# Patient Record
Sex: FEMALE | Race: OTHER
[De-identification: names, ages, dates, MRNs, and addresses within clinical notes are randomized per-mention and may not be internally consistent; named-entity substitution may affect disease eponyms.]

---

## 2019-10-13 ENCOUNTER — HOSPITAL ENCOUNTER (EMERGENCY)
Dept: HOSPITAL 72 - EMR | Age: 84
Discharge: ANOTHER HOSPITAL | End: 2019-10-13
Payer: MEDICARE

## 2019-10-13 VITALS — DIASTOLIC BLOOD PRESSURE: 56 MMHG | SYSTOLIC BLOOD PRESSURE: 166 MMHG

## 2019-10-13 VITALS — SYSTOLIC BLOOD PRESSURE: 152 MMHG | DIASTOLIC BLOOD PRESSURE: 74 MMHG

## 2019-10-13 VITALS — DIASTOLIC BLOOD PRESSURE: 74 MMHG | SYSTOLIC BLOOD PRESSURE: 152 MMHG

## 2019-10-13 VITALS — SYSTOLIC BLOOD PRESSURE: 145 MMHG | DIASTOLIC BLOOD PRESSURE: 83 MMHG

## 2019-10-13 VITALS — BODY MASS INDEX: 25.76 KG/M2 | WEIGHT: 140 LBS | HEIGHT: 62 IN

## 2019-10-13 DIAGNOSIS — I51.7: ICD-10-CM

## 2019-10-13 DIAGNOSIS — I73.9: ICD-10-CM

## 2019-10-13 DIAGNOSIS — F03.90: ICD-10-CM

## 2019-10-13 DIAGNOSIS — N18.9: ICD-10-CM

## 2019-10-13 DIAGNOSIS — I12.9: ICD-10-CM

## 2019-10-13 DIAGNOSIS — I63.81: ICD-10-CM

## 2019-10-13 DIAGNOSIS — I10: ICD-10-CM

## 2019-10-13 DIAGNOSIS — H31.301: Primary | ICD-10-CM

## 2019-10-13 DIAGNOSIS — E78.5: ICD-10-CM

## 2019-10-13 DIAGNOSIS — F17.200: ICD-10-CM

## 2019-10-13 LAB
ADD MANUAL DIFF: NO
ALBUMIN SERPL-MCNC: 3 G/DL (ref 3.4–5)
ALBUMIN/GLOB SERPL: 0.8 {RATIO} (ref 1–2.7)
ALP SERPL-CCNC: 106 U/L (ref 46–116)
ALT SERPL-CCNC: 15 U/L (ref 12–78)
ANION GAP SERPL CALC-SCNC: 7 MMOL/L (ref 5–15)
AST SERPL-CCNC: 16 U/L (ref 15–37)
BASOPHILS NFR BLD AUTO: 0.6 % (ref 0–2)
BILIRUB SERPL-MCNC: 0.3 MG/DL (ref 0.2–1)
BUN SERPL-MCNC: 30 MG/DL (ref 7–18)
CALCIUM SERPL-MCNC: 9.7 MG/DL (ref 8.5–10.1)
CHLORIDE SERPL-SCNC: 106 MMOL/L (ref 98–107)
CK SERPL-CCNC: 32 U/L (ref 26–308)
CO2 SERPL-SCNC: 30 MMOL/L (ref 21–32)
CREAT SERPL-MCNC: 1.8 MG/DL (ref 0.55–1.3)
EOSINOPHIL NFR BLD AUTO: 2.5 % (ref 0–3)
ERYTHROCYTE [DISTWIDTH] IN BLOOD BY AUTOMATED COUNT: 12.1 % (ref 11.6–14.8)
GLOBULIN SER-MCNC: 3.8 G/DL
HCT VFR BLD CALC: 37.4 % (ref 37–47)
HGB BLD-MCNC: 12.2 G/DL (ref 12–16)
LYMPHOCYTES NFR BLD AUTO: 24.2 % (ref 20–45)
MCV RBC AUTO: 91 FL (ref 80–99)
MONOCYTES NFR BLD AUTO: 7.7 % (ref 1–10)
NEUTROPHILS NFR BLD AUTO: 65 % (ref 45–75)
PLATELET # BLD: 168 K/UL (ref 150–450)
POTASSIUM SERPL-SCNC: 4.5 MMOL/L (ref 3.5–5.1)
RBC # BLD AUTO: 4.09 M/UL (ref 4.2–5.4)
SODIUM SERPL-SCNC: 143 MMOL/L (ref 136–145)
WBC # BLD AUTO: 8.1 K/UL (ref 4.8–10.8)

## 2019-10-13 PROCEDURE — 96375 TX/PRO/DX INJ NEW DRUG ADDON: CPT

## 2019-10-13 PROCEDURE — 85025 COMPLETE CBC W/AUTO DIFF WBC: CPT

## 2019-10-13 PROCEDURE — 70450 CT HEAD/BRAIN W/O DYE: CPT

## 2019-10-13 PROCEDURE — 96374 THER/PROPH/DIAG INJ IV PUSH: CPT

## 2019-10-13 PROCEDURE — 82550 ASSAY OF CK (CPK): CPT

## 2019-10-13 PROCEDURE — 36415 COLL VENOUS BLD VENIPUNCTURE: CPT

## 2019-10-13 PROCEDURE — 70551 MRI BRAIN STEM W/O DYE: CPT

## 2019-10-13 PROCEDURE — 93005 ELECTROCARDIOGRAM TRACING: CPT

## 2019-10-13 PROCEDURE — 99284 EMERGENCY DEPT VISIT MOD MDM: CPT

## 2019-10-13 PROCEDURE — 96361 HYDRATE IV INFUSION ADD-ON: CPT

## 2019-10-13 PROCEDURE — 80053 COMPREHEN METABOLIC PANEL: CPT

## 2019-10-13 NOTE — NUR
ED Nurse Note:



[facility] ambulance has arrived for transport, rig#[id number], verbal report and all 
transfer info given to LEONARDO Mares, pt being transported via gurney on critical 
care transport, report called to [facility] at [phone], report 
given to LEONARDO Silverio, nad noted during pt transpsort.

## 2019-10-13 NOTE — DIAGNOSTIC IMAGING REPORT
EXAM:

  CT Head Without Intravenous Contrast

 

CLINICAL HISTORY:

  TRAUMA

 

TECHNIQUE:

  Axial computed tomography images of the head brain without intravenous 

contrast.  CTDI is 62.7 mGy and DLP is 1394.9 mGy-cm.  One or more of the 

following dose reduction techniques were used: automated exposure control,

 adjustment of the mA and or kV according to patient size, use of 

iterative reconstruction technique.

 

COMPARISON:

  None

 

FINDINGS:

  Brain: No acute infarct or hemorrhage identified. No extra-axial fluid 

collection. No mass effect or midline shift. Scattered areas of 

hypoattenuation in the supratentorial white matter likely represent 

chronic small vessel ischemic changes.  Remote lacunar infarcts in the 

basal ganglia.

 

  Ventricles and sulci: Prominence of the ventricles and sulci is likely 

secondary to cerebral volume loss.  Question small amount of blood along 

the choroid plexus in the right lateral ventricle.

 

  Skull: Hyperostosis frontalis interna. No bony lesion or fracture.

 

  Subcutaneous tissues: Normal.

 

  Sinuses: Mild mucosal thickening in the right sphenoid sinus with 

hyperostosis of the sinus walls, suggesting chronic sinusitis.  Mild 

mucosal thickening in the ethmoid air cells.

 

  Orbits: Bilateral lens implants.

 

  Other: Atherosclerotic calcifications in the intracranial vasculature.

 

IMPRESSION:   

1.  Probable small amount of blood along the choroid plexus of the right 

lateral ventricle.  No other acute intracranial abnormality identified.

2.  Chronic small vessel ischemic changes and cerebral volume loss.  

Remote lacunar infarcts in the basal ganglia.

## 2019-10-13 NOTE — NUR
ED Nurse Note:

PT AAOX1, VSS, NO ACUTE DISTRESS. Brought in by ambulance from [facility]; patient was found on the floor ~ 0250  today; possible fall injury; 
patient is on ASA and Heparin. Patient c/o pain over the righ head. ECCYMOSIS 
ON THE LEFT KNEE. NO OTHER SKIN ISSUES.

## 2019-10-13 NOTE — EMERGENCY ROOM REPORT
History of Present Illness


General


Chief Complaint:  Multiple Trauma/Fall


Source:  Medical Record, EMS


 (Jin Ramey MD)





Present Illness


HPI


Disclaimer: Please note that this report is being documented using [employer]AGON 

technology. This can lead to erroneous entry secondary to incorrect 

interpretation by the dictating instrument.





HPI: Patient is an 88-year-old female, Farsi speaking, with a history of AAA 

nonruptured, hypertension, hyperlipidemia, CKD, dementia, PVD, renal artery 

stenosis status post stent and a chronic smoker presenting for evaluation after 

she was found on the floor.  According to EMS, the patient was found by nursing 

staff next to her bed approximately 2:50 AM.  Unknown when the patient was last 

seen.  Unknown how long she was down on the ground.


 


PMH: Hypertension, hyperlipidemia, AAA, renal artery stenosis, PVD, CKD,


 


PSH: Renal artery stent


 


Allergies: Denies


 


Social Hx: Chronic smoker


 (Jin Ramey MD)


Allergies:  


Coded Allergies:  


     No Known Allergies (Unverified , 10/13/19)





Nursing Documentation-PMH


Past Medical History:  No History, Except For


Hx Cardiac Problems:  Yes - PVD, hyperlipidemia


Hx Hypertension:  Yes


Hx Gastrointestinal Problems:  Yes - dysphagia


Hx Neurological Problems:  Yes - Metabolic encephalopathy, muscle weakness, 

dementia


 (Jin Ramey MD)





Review of Systems


All Other Systems:  negative except mentioned in HPI


 (Jin Ramey MD)





Physical Exam





Vital Signs








  Date Time  Temp Pulse Resp B/P (MAP) Pulse Ox O2 Delivery O2 Flow Rate FiO2


 


10/13/19 09:51 97.3 74 18 155/57 (89) 96 Room Air  





 


General: Awake and alert, no acute distress


HEENT: Normocephalic, atraumatic.  There are no scalp or face hematomas, 

lacerations or abrasions.  No tenderness or soft tissue swelling over the 

facial bones.  EOMI. PERRLA.  No septal hematoma.  No oral lacerations.  

Dentition is intact.  No malocclusion


Neck: Supple, trachea midline.  Arrives without cervical collar


Chest Wall: No tenderness, no deformity, no crepitus


CV: RRR.  S1 and S2 normal.  No murmur appreciated


Resp: Normal work of breathing. No cough, wheezing or crackles appreciated


Abd: Soft, nontender, nondistended


Skin: Intact.  No abrasions, laceration or rash over the exposed skin


MSK: Normal tone and bulk.  No obvious deformity. Moving all extremities.  

Ambulating without difficulty. 


Neuro: Awake and alert.  Mentating appropriately.  Sensation is intact to light 

touch over the dermatomes of the upper and lower extremities


Spine: There is no tenderness, step-off or deformity in the cervical, thoracic 

or lumbosacral spine.


 (Jin Ramey MD)





Procedures


Critical Care Time


Critical Care Time


Total Critical Care Time: 30 min bedside evaluation and treatment excludes 

procedures (EKG).


Reason for critical care: Intracerebral bleed


Possible complications: hypotension, hypertension, MI, shock, arrhythmias, 

metabolic acidosis, end organ damage, respiratory failure.


Interventions: Discussion with [facility] regarding higher level of care.  Repeat 

neurologic exams.


Course: Patient presented after unwitnessed fall.  Initial CT showed question 

of choroid plexus bleed on the right.  MRI was performed.  This confirmed 

possible choroid plexus bleed.  Reassessment neurologic exam unchanged.  

Contact [facility] with multiple discussions.  Attempt to contact family 

unsuccessful.  Accepted by [facility] neurosurgeon.  EKG reviewed.  Transferred by 

ALS.


Consultations: nursing staff, EMS, attempt to contact family, discussions with 

[facility] transfer and neurosurgeon


Performed by: Dr. Rubio


Tolerated well condition = critical but stable for transfer


 (Bry Rubio MD)





Medical Decision Making


Diagnostic Impression:  


 Primary Impression:  


 Fall


 Qualified Codes:  W19.XXXA - Unspecified fall, initial encounter


 Additional Impression:  


 Choroid plexus hemorrhage


ER Course


88-year-old female presents for evaluation after a head injury.  Will obtain CT 

scan of the head and broad metabolic and infectious labs to determine the cause 

of her fall.  Will await family members to provide additional history.  She has 

stable vital signs and is overall well-appearing.





Laboratory Tests








Test


  10/13/19


10:00


 


White Blood Count


  8.1 K/UL


(4.8-10.8)


 


Red Blood Count


  4.09 M/UL


(4.20-5.40)  L


 


Hemoglobin


  12.2 G/DL


(12.0-16.0)


 


Hematocrit


  37.4 %


(37.0-47.0)


 


Mean Corpuscular Volume 91 FL (80-99)  


 


Mean Corpuscular Hemoglobin


  29.8 PG


(27.0-31.0)


 


Mean Corpuscular Hemoglobin


Concent 32.6 G/DL


(32.0-36.0)


 


Red Cell Distribution Width


  12.1 %


(11.6-14.8)


 


Platelet Count


  168 K/UL


(150-450)


 


Mean Platelet Volume


  6.4 FL


(6.5-10.1)  L


 


Neutrophils (%) (Auto)


  65.0 %


(45.0-75.0)


 


Lymphocytes (%) (Auto)


  24.2 %


(20.0-45.0)


 


Monocytes (%) (Auto)


  7.7 %


(1.0-10.0)


 


Eosinophils (%) (Auto)


  2.5 %


(0.0-3.0)


 


Basophils (%) (Auto)


  0.6 %


(0.0-2.0)


 


Sodium Level


  143 MMOL/L


(136-145)


 


Potassium Level


  4.5 MMOL/L


(3.5-5.1)


 


Chloride Level


  106 MMOL/L


()


 


Carbon Dioxide Level


  30 MMOL/L


(21-32)


 


Anion Gap


  7 mmol/L


(5-15)


 


Blood Urea Nitrogen


  30 mg/dL


(7-18)  H


 


Creatinine


  1.8 MG/DL


(0.55-1.30)  H


 


Estimate Glomerular


Filtration Rate  mL/min (>60)  


 


 


Glucose Level


  111 MG/DL


()  H


 


Calcium Level


  9.7 MG/DL


(8.5-10.1)


 


Total Bilirubin


  0.3 MG/DL


(0.2-1.0)


 


Aspartate Amino Transferase


(AST) 16 U/L (15-37)


 


 


Alanine Aminotransferase (ALT)


  15 U/L (12-78)


 


 


Alkaline Phosphatase


  106 U/L


()


 


Total Creatine Kinase


  32 U/L


()


 


Total Protein


  6.8 G/DL


(6.4-8.2)


 


Albumin


  3.0 G/DL


(3.4-5.0)  L


 


Globulin 3.8 g/dL  


 


Albumin/Globulin Ratio


  0.8 (1.0-2.7)


L








 (Jin Ramey MD)


ER Course


Patient signed out to me by Dr. Ramey.


Patient ambulates with assistance.  She has underlying dementia.


There is some question on CT scan about a possible choroid plexus bleed.  An 

MRI was performed.


Patient was pretreated with Ativan successfully.


MRI suggests also the choroid plexus bleed.  [facility] is being contacted.





Call [facility]. They are presenting.


17:45 discussed with and presented to Dr. Vigil.  Multiple questions 

answered for him.  He requests that the CT and MRI be messenger to [facility].  He 

also requested that we attempt to contact family about CODE STATUS and how 

aggressive they want us to pursue this finding.





Accepted by Dr. Aquino at [facility] to ICU.  Transfer ALS.  Will obtain EKG.





EKG normal sinus rhythm LVH nonspecific ST T wave changes.





Neurologic exam unchanged but sedated.  Stable for transfer ALS.


 (Bry Rubio MD)





EKG Diagnostic Results


Rate:  normal


Rhythm:  NSR


ST Segments:  no acute changes - Left ventricular hypertrophy


 (Bry Rubio MD)





Rhythm Strip Diag. Results


EP Interpretation:  yes


Rhythm:  NSR, no PVC's, no ectopy, other


 (Bry Rubio MD)





CT/MRI/US Diagnostic Results


CT/MRI/US Diagnostic Results :  


   Impression


Final Report


EXAM:


CT Head Without Intravenous Contrast





CLINICAL HISTORY:


TRAUMA





TECHNIQUE:


Axial computed tomography images of the head/brain without intravenous 

contrast. CTDI is 62.7 mGy and DLP is 1394.9 mGy-cm. One or more of the 

following dose reduction techniques were used: automated exposure control, 

adjustment of the mA and/or kV according to patient size, use of iterative 

reconstruction technique.





COMPARISON:


None





FINDINGS:


Brain: No acute infarct or hemorrhage identified. No extra-axial fluid 

collection. No mass effect or midline shift. Scattered areas of hypoattenuation 

in the supratentorial white matter likely represent chronic small vessel 

ischemic changes. Remote lacunar infarcts in the basal ganglia.





Ventricles and sulci: Prominence of the ventricles and sulci is likely 

secondary to cerebral volume loss. Question small amount of blood along the 

choroid plexus in the right lateral ventricle.





Skull: Hyperostosis frontalis interna. No bony lesion or fracture.





Subcutaneous tissues: Normal.





Sinuses: Mild mucosal thickening in the right sphenoid sinus with hyperostosis 

of the sinus walls, suggesting chronic sinusitis. Mild mucosal thickening in 

the ethmoid air cells.





Orbits: Bilateral lens implants.





Other: Atherosclerotic calcifications in the intracranial vasculature.





IMPRESSION:


1. Probable small amount of blood along the choroid plexus of the right lateral 

ventricle. No other acute intracranial abnormality identified.


2. Chronic small vessel ischemic changes and cerebral volume loss. Remote 

lacunar infarcts in the basal ganglia.


 (Jin Ramey MD)


CT/MRI/US Diagnostic Results :  


   Imaging Test Ordered:  MRI brain


   Impression


Comparison is made to CT head on 10 13 2019.


No restricted diffusion to suggest acute infarct.


Susceptibility artifact along the choroid plexus in the right lateral ventricle 

confirms presence of small amount of blood products.


Other scattered foci of susceptibility artifact in the cerebral and cerebellar 

hemispheres may represent old blood products and or calcifications.


Chronic small vessel ischemic disease and cerebral volume loss.  Remote lacunar 

infarcts in the basal ganglia.


Bilateral lens implants.


Mucosal thickening in the ethmoid air cells and right sphenoid sinus. 


 (Bry Rubio MD)


Reevaluation Time:  13:29





Last Vital Signs








  Date Time  Temp Pulse Resp B/P (MAP) Pulse Ox O2 Delivery O2 Flow Rate FiO2


 


10/13/19 09:51 97.3 74 18 155/57 (89) 96 Room Air  








Reevaluation Impression


Labs have returned largely within normal limits.  The patient does have an 

elevated creatinine at 1.8 however according to transfer paperwork her baseline 

appears to be between 1.3 and 1.5.  There is a possible bleed in the right 

choroid plexus but will evaluate further with an MRI.  The patient is stable 

and well-appearing otherwise with unremarkable labs.  I have updated the patient

's PMD, Dr. Nice.  Disposition depending on MRI results.


 (Jin Ramey MD)





Last Vital Signs








  Date Time  Temp Pulse Resp B/P (MAP) Pulse Ox O2 Delivery O2 Flow Rate FiO2


 


10/13/19 21:30 98.4 71 15 152/74 98 Room Air  








Status:  unchanged


 (Bry Rubio MD)


Disposition:  XFER SHT-TRM HOSP - higher level of care


Condition:  Critical











Jin Ramey MD Oct 13, 2019 09:59


Bry Rubio MD Oct 13, 2019 17:11

## 2019-10-13 NOTE — NUR
ED Nurse Note:



Pt continues to rest in bed, incontinent of urine, pt given complete bath and 
linen change, no distress or changes noted, iv site patent and wrapped with 
kerlix for support, v/s stable, mildly high b/p, MD is aware, pt has ambulance 
ETA fopr transport, [facility] called and given 1 more hour, spoke with pt 
daughter on phone whom called and informed her of transfer and gave room info 
at [facility], will continue to closely monitor while waiting for transport.

## 2019-10-13 NOTE — DIAGNOSTIC IMAGING REPORT
Indication: Headache, trauma, altered mental status, abnormal CT scan

 

Technique: sagittal T1 fast spin echo, axial T1 FLAIR, axial T2 FLAIR, axial T2 FS

PROPELLER, axial T2* GRE, axial diffusion weighted images. ADC and exponential ADC

maps generated

 

Comparison: CT scan performed 6 hours earlier

 

Findings: There is a small  apparent focus of restricted diffusion in the area of the

genu of the right internal capsule. However, this appears to be high signal on the

ADC maps so this is probably an area of so-called T2 shine through related to an old

lacunar infarct. No other foci of restricted diffusion are demonstrated.

Susceptibility artifact is seen along the choroid plexus of the right lateral

ventricular atrium, corresponding to the abnormality described on recent CT scan.

There is also some abnormal high T2 FLAIR signal in this area. Numerous other foci of

susceptibility artifact are seen in the bilateral left greater than right basal

ganglia, in the bilateral temporal lobes, and in the bilateral cerebellar

hemispheres. No mass effect nor midline shift. There is age-related enlargement of

the ventricles and extra axial CSF spaces. There is extensive confluent

periventricular deep white matter high T2 signal. There is evidence of prior

bilateral cataract surgery. There is bilateral ethmoid sinus disease. The vascular

flow voids are preserved. Old lacunar infarcts are seen in the bilateral basal

ganglia.

 

Impression: Small area of susceptible artifact and abnormal T2 FLAIR signal in the

region of the right lateral ventricular choroid plexus confirming the presence of

acute blood reported initially on prior CT scan.

 

Multiple foci of susceptibility artifact as described, consistent with old

microbleeds

 

Chronic and age-related changes, as described, including age-related volume loss and

fairly extensive periventricular deep white matter chronic ischemic change

 

Sinus disease

 

Apparent restricted diffusion in the right internal capsule probably represents

artifact from so-called T2 shine through rather than an acute infarct

 

This agrees with the preliminary interpretation provided overnight by [facility]

teleradiology service.

## 2019-10-13 NOTE — NUR
ED Nurse Note:



Recieved report from LEONARDO Berger to resume care, pt in bed resting quietly, 
appears to be sleeping, pt on cardiac monitoring, pt is waiting for transfer 
info to go to [facility] for bleed, pt here for falls from snf, will 
resume care as ordered, place IV line and prepare for transfer, pt has severe 
dementia, unable to completely do neuro assessment, pupils are reactive, pt 
moving all extremitites, unable to do , no s/s of pain, will continue to 
closely monitor.